# Patient Record
Sex: FEMALE | Race: WHITE | Employment: UNEMPLOYED | URBAN - METROPOLITAN AREA
[De-identification: names, ages, dates, MRNs, and addresses within clinical notes are randomized per-mention and may not be internally consistent; named-entity substitution may affect disease eponyms.]

---

## 2019-01-01 ENCOUNTER — ANESTHESIA (OUTPATIENT)
Dept: SURGERY | Age: 49
End: 2019-01-01
Payer: MEDICARE

## 2019-01-01 ENCOUNTER — HOSPITAL ENCOUNTER (EMERGENCY)
Age: 49
Discharge: HOME | End: 2019-07-21
Attending: EMERGENCY MEDICINE
Payer: MEDICARE

## 2019-01-01 ENCOUNTER — ANESTHESIA EVENT (OUTPATIENT)
Dept: SURGERY | Age: 49
End: 2019-01-01
Payer: MEDICARE

## 2019-01-01 ENCOUNTER — APPOINTMENT (OUTPATIENT)
Dept: GENERAL RADIOLOGY | Age: 49
End: 2019-01-01
Attending: UROLOGY
Payer: MEDICARE

## 2019-01-01 ENCOUNTER — HOSPITAL ENCOUNTER (OUTPATIENT)
Age: 49
Setting detail: OUTPATIENT SURGERY
Discharge: HOME | End: 2019-07-18
Attending: UROLOGY | Admitting: UROLOGY
Payer: MEDICARE

## 2019-01-01 VITALS
DIASTOLIC BLOOD PRESSURE: 59 MMHG | HEIGHT: 65 IN | OXYGEN SATURATION: 98 % | HEART RATE: 95 BPM | WEIGHT: 135 LBS | BODY MASS INDEX: 22.49 KG/M2 | SYSTOLIC BLOOD PRESSURE: 102 MMHG | RESPIRATION RATE: 16 BRPM | TEMPERATURE: 99.4 F

## 2019-01-01 VITALS
HEIGHT: 63 IN | WEIGHT: 130 LBS | TEMPERATURE: 98.9 F | BODY MASS INDEX: 23.04 KG/M2 | HEART RATE: 87 BPM | SYSTOLIC BLOOD PRESSURE: 91 MMHG | RESPIRATION RATE: 18 BRPM | OXYGEN SATURATION: 93 % | DIASTOLIC BLOOD PRESSURE: 47 MMHG

## 2019-01-01 DIAGNOSIS — N20.0 MULTIPLE RENAL CALCULI: ICD-10-CM

## 2019-01-01 DIAGNOSIS — R31.9 URINARY TRACT INFECTION WITH HEMATURIA, SITE UNSPECIFIED: Primary | ICD-10-CM

## 2019-01-01 DIAGNOSIS — N20.1 RIGHT URETERAL CALCULUS: Primary | ICD-10-CM

## 2019-01-01 DIAGNOSIS — N39.0 URINARY TRACT INFECTION WITH HEMATURIA, SITE UNSPECIFIED: Primary | ICD-10-CM

## 2019-01-01 LAB
ANION GAP SERPL CALC-SCNC: 4 MMOL/L (ref 3–18)
APPEARANCE UR: ABNORMAL
BACTERIA SPEC CULT: ABNORMAL
BACTERIA SPEC CULT: NORMAL
BACTERIA URNS QL MICRO: ABNORMAL /HPF
BASOPHILS # BLD: 3.3 K/UL (ref 0–0.1)
BASOPHILS NFR BLD: 2 % (ref 0–3)
BILIRUB UR QL: NEGATIVE
BLASTS NFR BLD MANUAL: 4 %
BUN SERPL-MCNC: 25 MG/DL (ref 7–18)
BUN/CREAT SERPL: 22 (ref 12–20)
CALCIUM SERPL-MCNC: 8.8 MG/DL (ref 8.5–10.1)
CHLORIDE SERPL-SCNC: 107 MMOL/L (ref 100–111)
CO2 SERPL-SCNC: 29 MMOL/L (ref 21–32)
COLOR STONE: NORMAL
COLOR UR: YELLOW
COMMENT, 519994: NORMAL
COMPOSITION, 120440: NORMAL
CREAT SERPL-MCNC: 1.12 MG/DL (ref 0.6–1.3)
DIFFERENTIAL METHOD BLD: ABNORMAL
DISCLAIMER, STO32L: NORMAL
EOSINOPHIL # BLD: 1.7 K/UL (ref 0–0.4)
EOSINOPHIL NFR BLD: 1 % (ref 0–5)
EPITH CASTS URNS QL MICRO: ABNORMAL /LPF (ref 0–5)
ERYTHROCYTE [DISTWIDTH] IN BLOOD BY AUTOMATED COUNT: 18.1 % (ref 11.6–14.5)
GLUCOSE SERPL-MCNC: 96 MG/DL (ref 74–99)
GLUCOSE UR STRIP.AUTO-MCNC: NEGATIVE MG/DL
HCT VFR BLD AUTO: 31.4 % (ref 35–45)
HGB BLD-MCNC: 9.8 G/DL (ref 12–16)
HGB UR QL STRIP: ABNORMAL
KETONES UR QL STRIP.AUTO: NEGATIVE MG/DL
LEUKOCYTE ESTERASE UR QL STRIP.AUTO: ABNORMAL
LYMPHOCYTES # BLD: 9.9 K/UL (ref 0.8–3.5)
LYMPHOCYTES NFR BLD: 6 % (ref 20–51)
MCH RBC QN AUTO: 30.1 PG (ref 24–34)
MCHC RBC AUTO-ENTMCNC: 31.2 G/DL (ref 31–37)
MCV RBC AUTO: 96.3 FL (ref 74–97)
METAMYELOCYTES NFR BLD MANUAL: 9 %
MONOCYTES # BLD: 31.5 K/UL (ref 0–1)
MONOCYTES NFR BLD: 19 % (ref 2–9)
MYELOCYTES NFR BLD MANUAL: 4 %
NEUTS BAND NFR BLD MANUAL: 30 % (ref 0–5)
NEUTS SEG # BLD: 34.8 K/UL (ref 1.8–8)
NEUTS SEG NFR BLD: 21 % (ref 42–75)
NIDUS STONE QL: NORMAL
NITRITE UR QL STRIP.AUTO: POSITIVE
NRBC BLD-RTO: 3 PER 100 WBC
PATH REV BLD -IMP: NORMAL
PH UR STRIP: 6 [PH] (ref 5–8)
PLATELET # BLD AUTO: 75 K/UL (ref 135–420)
PLATELET COMMENTS,PCOM: ABNORMAL
POTASSIUM SERPL-SCNC: 3.2 MMOL/L (ref 3.5–5.5)
PROMYELOCYTES NFR BLD MANUAL: 4 %
PROT UR STRIP-MCNC: 300 MG/DL
RBC # BLD AUTO: 3.26 M/UL (ref 4.2–5.3)
RBC #/AREA URNS HPF: ABNORMAL /HPF (ref 0–5)
RBC MORPH BLD: ABNORMAL
SERVICE CMNT-IMP: ABNORMAL
SERVICE CMNT-IMP: NORMAL
SIZE STONE: NORMAL MM
SODIUM SERPL-SCNC: 140 MMOL/L (ref 136–145)
SP GR UR REFRACTOMETRY: 1.02 (ref 1–1.03)
URATE CRY URNS QL MICRO: ABNORMAL
URATE MFR STONE: 100 %
UROBILINOGEN UR QL STRIP.AUTO: 1 EU/DL (ref 0.2–1)
WBC # BLD AUTO: 165.7 K/UL (ref 4.6–13.2)
WBC URNS QL MICRO: ABNORMAL /HPF (ref 0–4)
WT STONE: 34.8 MG

## 2019-01-01 PROCEDURE — C1758 CATHETER, URETERAL: HCPCS | Performed by: UROLOGY

## 2019-01-01 PROCEDURE — 87086 URINE CULTURE/COLONY COUNT: CPT

## 2019-01-01 PROCEDURE — 77030018842 HC SOL IRR SOD CL 9% BAXT -A: Performed by: UROLOGY

## 2019-01-01 PROCEDURE — 77030006974 HC BSKT URET RTVR BSC -C: Performed by: UROLOGY

## 2019-01-01 PROCEDURE — 96376 TX/PRO/DX INJ SAME DRUG ADON: CPT

## 2019-01-01 PROCEDURE — 74011250636 HC RX REV CODE- 250/636: Performed by: UROLOGY

## 2019-01-01 PROCEDURE — C2617 STENT, NON-COR, TEM W/O DEL: HCPCS | Performed by: UROLOGY

## 2019-01-01 PROCEDURE — 74011250636 HC RX REV CODE- 250/636: Performed by: EMERGENCY MEDICINE

## 2019-01-01 PROCEDURE — 77030008683 HC TU ET CUF COVD -A: Performed by: ANESTHESIOLOGY

## 2019-01-01 PROCEDURE — 74011250637 HC RX REV CODE- 250/637: Performed by: NURSE ANESTHETIST, CERTIFIED REGISTERED

## 2019-01-01 PROCEDURE — 74011636320 HC RX REV CODE- 636/320: Performed by: UROLOGY

## 2019-01-01 PROCEDURE — 87040 BLOOD CULTURE FOR BACTERIA: CPT

## 2019-01-01 PROCEDURE — 87077 CULTURE AEROBIC IDENTIFY: CPT

## 2019-01-01 PROCEDURE — 81001 URINALYSIS AUTO W/SCOPE: CPT

## 2019-01-01 PROCEDURE — 96375 TX/PRO/DX INJ NEW DRUG ADDON: CPT

## 2019-01-01 PROCEDURE — 74011250636 HC RX REV CODE- 250/636: Performed by: NURSE ANESTHETIST, CERTIFIED REGISTERED

## 2019-01-01 PROCEDURE — 87184 SC STD DISK METHOD PER PLATE: CPT

## 2019-01-01 PROCEDURE — 77030008477 HC STYL SATN SLP COVD -A: Performed by: ANESTHESIOLOGY

## 2019-01-01 PROCEDURE — 96365 THER/PROPH/DIAG IV INF INIT: CPT

## 2019-01-01 PROCEDURE — 76210000016 HC OR PH I REC 1 TO 1.5 HR: Performed by: UROLOGY

## 2019-01-01 PROCEDURE — 80048 BASIC METABOLIC PNL TOTAL CA: CPT

## 2019-01-01 PROCEDURE — 74011250636 HC RX REV CODE- 250/636

## 2019-01-01 PROCEDURE — C1726 CATH, BAL DIL, NON-VASCULAR: HCPCS | Performed by: UROLOGY

## 2019-01-01 PROCEDURE — 99283 EMERGENCY DEPT VISIT LOW MDM: CPT

## 2019-01-01 PROCEDURE — 77030032490 HC SLV COMPR SCD KNE COVD -B: Performed by: UROLOGY

## 2019-01-01 PROCEDURE — 77030012961 HC IRR KT CYSTO/TUR ICUM -A: Performed by: UROLOGY

## 2019-01-01 PROCEDURE — 76210000021 HC REC RM PH II 0.5 TO 1 HR: Performed by: UROLOGY

## 2019-01-01 PROCEDURE — 77030013079 HC BLNKT BAIR HGGR 3M -A: Performed by: ANESTHESIOLOGY

## 2019-01-01 PROCEDURE — 85025 COMPLETE CBC W/AUTO DIFF WBC: CPT

## 2019-01-01 PROCEDURE — 77030018836 HC SOL IRR NACL ICUM -A: Performed by: UROLOGY

## 2019-01-01 PROCEDURE — 96361 HYDRATE IV INFUSION ADD-ON: CPT

## 2019-01-01 PROCEDURE — 74420 UROGRAPHY RTRGR +-KUB: CPT

## 2019-01-01 PROCEDURE — 74011000250 HC RX REV CODE- 250

## 2019-01-01 PROCEDURE — C1769 GUIDE WIRE: HCPCS | Performed by: UROLOGY

## 2019-01-01 PROCEDURE — 76060000034 HC ANESTHESIA 1.5 TO 2 HR: Performed by: UROLOGY

## 2019-01-01 PROCEDURE — 76010000162 HC OR TIME 1.5 TO 2 HR INTENSV-TIER 1: Performed by: UROLOGY

## 2019-01-01 PROCEDURE — 82360 CALCULUS ASSAY QUANT: CPT

## 2019-01-01 PROCEDURE — 77030018846 HC SOL IRR STRL H20 ICUM -A: Performed by: UROLOGY

## 2019-01-01 DEVICE — URETERAL STENT
Type: IMPLANTABLE DEVICE | Site: URETER | Status: FUNCTIONAL
Brand: POLARIS™ ULTRA

## 2019-01-01 RX ORDER — SODIUM CHLORIDE, SODIUM LACTATE, POTASSIUM CHLORIDE, CALCIUM CHLORIDE 600; 310; 30; 20 MG/100ML; MG/100ML; MG/100ML; MG/100ML
25 INJECTION, SOLUTION INTRAVENOUS CONTINUOUS
Status: DISCONTINUED | OUTPATIENT
Start: 2019-01-01 | End: 2019-01-01 | Stop reason: HOSPADM

## 2019-01-01 RX ORDER — ONDANSETRON 2 MG/ML
4 INJECTION INTRAMUSCULAR; INTRAVENOUS ONCE
Status: DISCONTINUED | OUTPATIENT
Start: 2019-01-01 | End: 2019-01-01 | Stop reason: HOSPADM

## 2019-01-01 RX ORDER — SUCCINYLCHOLINE CHLORIDE 100 MG/5ML
SYRINGE (ML) INTRAVENOUS AS NEEDED
Status: DISCONTINUED | OUTPATIENT
Start: 2019-01-01 | End: 2019-01-01 | Stop reason: HOSPADM

## 2019-01-01 RX ORDER — MIDODRINE HYDROCHLORIDE 10 MG/1
20 TABLET ORAL 3 TIMES DAILY
COMMUNITY

## 2019-01-01 RX ORDER — ONDANSETRON 2 MG/ML
4 INJECTION INTRAMUSCULAR; INTRAVENOUS ONCE
Status: COMPLETED | OUTPATIENT
Start: 2019-01-01 | End: 2019-01-01

## 2019-01-01 RX ORDER — SODIUM CHLORIDE, SODIUM LACTATE, POTASSIUM CHLORIDE, CALCIUM CHLORIDE 600; 310; 30; 20 MG/100ML; MG/100ML; MG/100ML; MG/100ML
50 INJECTION, SOLUTION INTRAVENOUS CONTINUOUS
Status: DISCONTINUED | OUTPATIENT
Start: 2019-01-01 | End: 2019-01-01 | Stop reason: HOSPADM

## 2019-01-01 RX ORDER — NEOSTIGMINE METHYLSULFATE 1 MG/ML
INJECTION, SOLUTION INTRAVENOUS AS NEEDED
Status: DISCONTINUED | OUTPATIENT
Start: 2019-01-01 | End: 2019-01-01 | Stop reason: HOSPADM

## 2019-01-01 RX ORDER — OXYCODONE AND ACETAMINOPHEN 5; 325 MG/1; MG/1
1 TABLET ORAL
Qty: 12 TAB | Refills: 0 | Status: SHIPPED | OUTPATIENT
Start: 2019-01-01 | End: 2019-01-01

## 2019-01-01 RX ORDER — SODIUM CHLORIDE 0.9 % (FLUSH) 0.9 %
5-40 SYRINGE (ML) INJECTION AS NEEDED
Status: DISCONTINUED | OUTPATIENT
Start: 2019-01-01 | End: 2019-01-01 | Stop reason: HOSPADM

## 2019-01-01 RX ORDER — FUROSEMIDE 20 MG/1
20 TABLET ORAL DAILY
COMMUNITY

## 2019-01-01 RX ORDER — DOCUSATE SODIUM 100 MG/1
100 CAPSULE, LIQUID FILLED ORAL
Qty: 60 CAP | Refills: 0 | Status: SHIPPED | OUTPATIENT
Start: 2019-01-01 | End: 2019-01-01

## 2019-01-01 RX ORDER — LIDOCAINE HYDROCHLORIDE 20 MG/ML
INJECTION, SOLUTION EPIDURAL; INFILTRATION; INTRACAUDAL; PERINEURAL AS NEEDED
Status: DISCONTINUED | OUTPATIENT
Start: 2019-01-01 | End: 2019-01-01 | Stop reason: HOSPADM

## 2019-01-01 RX ORDER — MIDAZOLAM HYDROCHLORIDE 1 MG/ML
INJECTION, SOLUTION INTRAMUSCULAR; INTRAVENOUS AS NEEDED
Status: DISCONTINUED | OUTPATIENT
Start: 2019-01-01 | End: 2019-01-01 | Stop reason: HOSPADM

## 2019-01-01 RX ORDER — INSULIN LISPRO 100 [IU]/ML
INJECTION, SOLUTION INTRAVENOUS; SUBCUTANEOUS ONCE
Status: DISCONTINUED | OUTPATIENT
Start: 2019-01-01 | End: 2019-01-01 | Stop reason: HOSPADM

## 2019-01-01 RX ORDER — ONDANSETRON HYDROCHLORIDE 4 MG/2ML
INJECTION, SOLUTION INTRAMUSCULAR; INTRAVENOUS AS NEEDED
Status: DISCONTINUED | OUTPATIENT
Start: 2019-01-01 | End: 2019-01-01 | Stop reason: HOSPADM

## 2019-01-01 RX ORDER — HYDROMORPHONE HYDROCHLORIDE 1 MG/ML
1 INJECTION, SOLUTION INTRAMUSCULAR; INTRAVENOUS; SUBCUTANEOUS
Status: COMPLETED | OUTPATIENT
Start: 2019-01-01 | End: 2019-01-01

## 2019-01-01 RX ORDER — PROMETHAZINE HYDROCHLORIDE 25 MG/1
25 TABLET ORAL
COMMUNITY

## 2019-01-01 RX ORDER — OXYCODONE AND ACETAMINOPHEN 5; 325 MG/1; MG/1
1 TABLET ORAL AS NEEDED
Status: DISCONTINUED | OUTPATIENT
Start: 2019-01-01 | End: 2019-01-01 | Stop reason: HOSPADM

## 2019-01-01 RX ORDER — LEVOFLOXACIN 750 MG/1
750 TABLET ORAL DAILY
Qty: 10 TAB | Refills: 0 | Status: SHIPPED | OUTPATIENT
Start: 2019-01-01 | End: 2019-01-01 | Stop reason: ALTCHOICE

## 2019-01-01 RX ORDER — HYDROMORPHONE HYDROCHLORIDE 4 MG/1
4 TABLET ORAL AS NEEDED
COMMUNITY

## 2019-01-01 RX ORDER — CEPHALEXIN 250 MG/1
250 CAPSULE ORAL 3 TIMES DAILY
Qty: 9 CAP | Refills: 0 | Status: SHIPPED | OUTPATIENT
Start: 2019-01-01 | End: 2019-01-01

## 2019-01-01 RX ORDER — FENTANYL CITRATE 50 UG/ML
50 INJECTION, SOLUTION INTRAMUSCULAR; INTRAVENOUS AS NEEDED
Status: DISCONTINUED | OUTPATIENT
Start: 2019-01-01 | End: 2019-01-01 | Stop reason: HOSPADM

## 2019-01-01 RX ORDER — VECURONIUM BROMIDE FOR INJECTION 1 MG/ML
INJECTION, POWDER, LYOPHILIZED, FOR SOLUTION INTRAVENOUS AS NEEDED
Status: DISCONTINUED | OUTPATIENT
Start: 2019-01-01 | End: 2019-01-01 | Stop reason: HOSPADM

## 2019-01-01 RX ORDER — FOLIC ACID 1 MG/1
1 TABLET ORAL DAILY
COMMUNITY

## 2019-01-01 RX ORDER — FAMOTIDINE 20 MG/1
20 TABLET, FILM COATED ORAL
Status: COMPLETED | OUTPATIENT
Start: 2019-01-01 | End: 2019-01-01

## 2019-01-01 RX ORDER — DEXAMETHASONE SODIUM PHOSPHATE 4 MG/ML
INJECTION, SOLUTION INTRA-ARTICULAR; INTRALESIONAL; INTRAMUSCULAR; INTRAVENOUS; SOFT TISSUE AS NEEDED
Status: DISCONTINUED | OUTPATIENT
Start: 2019-01-01 | End: 2019-01-01 | Stop reason: HOSPADM

## 2019-01-01 RX ORDER — LEVOFLOXACIN 5 MG/ML
750 INJECTION, SOLUTION INTRAVENOUS
Status: COMPLETED | OUTPATIENT
Start: 2019-01-01 | End: 2019-01-01

## 2019-01-01 RX ORDER — GLYCOPYRROLATE 0.6MG/3ML
SYRINGE (ML) INTRAVENOUS AS NEEDED
Status: DISCONTINUED | OUTPATIENT
Start: 2019-01-01 | End: 2019-01-01 | Stop reason: HOSPADM

## 2019-01-01 RX ORDER — ERGOCALCIFEROL 1.25 MG/1
50000 CAPSULE ORAL
COMMUNITY

## 2019-01-01 RX ORDER — GENTAMICIN SULFATE 40 MG/ML
INJECTION, SOLUTION INTRAMUSCULAR; INTRAVENOUS AS NEEDED
Status: DISCONTINUED | OUTPATIENT
Start: 2019-01-01 | End: 2019-01-01 | Stop reason: HOSPADM

## 2019-01-01 RX ORDER — VENLAFAXINE HYDROCHLORIDE 225 MG/1
225 TABLET, EXTENDED RELEASE ORAL DAILY
COMMUNITY

## 2019-01-01 RX ORDER — PROPOFOL 10 MG/ML
INJECTION, EMULSION INTRAVENOUS AS NEEDED
Status: DISCONTINUED | OUTPATIENT
Start: 2019-01-01 | End: 2019-01-01 | Stop reason: HOSPADM

## 2019-01-01 RX ORDER — VARENICLINE TARTRATE 1 MG/1
1 TABLET, FILM COATED ORAL 2 TIMES DAILY
COMMUNITY

## 2019-01-01 RX ORDER — POTASSIUM CHLORIDE 750 MG/1
10 TABLET, FILM COATED, EXTENDED RELEASE ORAL 2 TIMES DAILY
COMMUNITY

## 2019-01-01 RX ORDER — ZOLPIDEM TARTRATE 12.5 MG/1
12.5 TABLET, FILM COATED, EXTENDED RELEASE ORAL
COMMUNITY

## 2019-01-01 RX ORDER — SODIUM CHLORIDE 0.9 % (FLUSH) 0.9 %
5-40 SYRINGE (ML) INJECTION EVERY 8 HOURS
Status: DISCONTINUED | OUTPATIENT
Start: 2019-01-01 | End: 2019-01-01 | Stop reason: HOSPADM

## 2019-01-01 RX ORDER — FENTANYL CITRATE 50 UG/ML
INJECTION, SOLUTION INTRAMUSCULAR; INTRAVENOUS AS NEEDED
Status: DISCONTINUED | OUTPATIENT
Start: 2019-01-01 | End: 2019-01-01 | Stop reason: HOSPADM

## 2019-01-01 RX ORDER — CEFAZOLIN SODIUM IN 0.9 % NACL 2 G/100 ML
PLASTIC BAG, INJECTION (ML) INTRAVENOUS AS NEEDED
Status: DISCONTINUED | OUTPATIENT
Start: 2019-01-01 | End: 2019-01-01 | Stop reason: HOSPADM

## 2019-01-01 RX ADMIN — HYDROMORPHONE HYDROCHLORIDE 1 MG: 1 INJECTION, SOLUTION INTRAMUSCULAR; INTRAVENOUS; SUBCUTANEOUS at 11:44

## 2019-01-01 RX ADMIN — Medication 70 MG: at 14:57

## 2019-01-01 RX ADMIN — Medication 2 G: at 15:15

## 2019-01-01 RX ADMIN — ONDANSETRON HYDROCHLORIDE 4 MG: 4 INJECTION, SOLUTION INTRAMUSCULAR; INTRAVENOUS at 16:10

## 2019-01-01 RX ADMIN — LIDOCAINE HYDROCHLORIDE 60 MG: 20 INJECTION, SOLUTION EPIDURAL; INFILTRATION; INTRACAUDAL; PERINEURAL at 14:57

## 2019-01-01 RX ADMIN — VECURONIUM BROMIDE FOR INJECTION 1 MG: 1 INJECTION, POWDER, LYOPHILIZED, FOR SOLUTION INTRAVENOUS at 15:10

## 2019-01-01 RX ADMIN — FAMOTIDINE 20 MG: 20 TABLET ORAL at 14:13

## 2019-01-01 RX ADMIN — SODIUM CHLORIDE 500 ML: 900 INJECTION, SOLUTION INTRAVENOUS at 11:48

## 2019-01-01 RX ADMIN — NEOSTIGMINE METHYLSULFATE 3 MG: 1 INJECTION, SOLUTION INTRAVENOUS at 16:18

## 2019-01-01 RX ADMIN — Medication 0.4 MG: at 16:18

## 2019-01-01 RX ADMIN — MIDAZOLAM HYDROCHLORIDE 2 MG: 1 INJECTION, SOLUTION INTRAMUSCULAR; INTRAVENOUS at 14:52

## 2019-01-01 RX ADMIN — FENTANYL CITRATE 50 MCG: 50 INJECTION, SOLUTION INTRAMUSCULAR; INTRAVENOUS at 14:57

## 2019-01-01 RX ADMIN — PROPOFOL 130 MG: 10 INJECTION, EMULSION INTRAVENOUS at 14:57

## 2019-01-01 RX ADMIN — VECURONIUM BROMIDE FOR INJECTION 1 MG: 1 INJECTION, POWDER, LYOPHILIZED, FOR SOLUTION INTRAVENOUS at 15:51

## 2019-01-01 RX ADMIN — LEVOFLOXACIN 750 MG: 750 INJECTION, SOLUTION INTRAVENOUS at 12:19

## 2019-01-01 RX ADMIN — HYDROMORPHONE HYDROCHLORIDE 1 MG: 1 INJECTION, SOLUTION INTRAMUSCULAR; INTRAVENOUS; SUBCUTANEOUS at 13:20

## 2019-01-01 RX ADMIN — ONDANSETRON 4 MG: 2 INJECTION INTRAMUSCULAR; INTRAVENOUS at 11:48

## 2019-01-01 RX ADMIN — FENTANYL CITRATE 50 MCG: 50 INJECTION, SOLUTION INTRAMUSCULAR; INTRAVENOUS at 16:55

## 2019-01-01 RX ADMIN — DEXAMETHASONE SODIUM PHOSPHATE 4 MG: 4 INJECTION, SOLUTION INTRA-ARTICULAR; INTRALESIONAL; INTRAMUSCULAR; INTRAVENOUS; SOFT TISSUE at 15:21

## 2019-01-01 RX ADMIN — FENTANYL CITRATE 25 MCG: 50 INJECTION, SOLUTION INTRAMUSCULAR; INTRAVENOUS at 15:57

## 2019-01-01 RX ADMIN — SODIUM CHLORIDE, SODIUM LACTATE, POTASSIUM CHLORIDE, AND CALCIUM CHLORIDE 50 ML/HR: 600; 310; 30; 20 INJECTION, SOLUTION INTRAVENOUS at 14:13

## 2019-01-01 RX ADMIN — VECURONIUM BROMIDE FOR INJECTION 2 MG: 1 INJECTION, POWDER, LYOPHILIZED, FOR SOLUTION INTRAVENOUS at 15:19

## 2019-01-01 RX ADMIN — OXYCODONE HYDROCHLORIDE AND ACETAMINOPHEN 1 TABLET: 5; 325 TABLET ORAL at 17:26

## 2019-07-16 NOTE — PERIOP NOTES
PAT - SURGICAL PRE-ADMISSION INSTRUCTIONS 
 
NAME:  Letty Marie                                                          TODAY'S DATE:  7/16/2019 SURGERY DATE:  7/18/2019                                  SURGERY ARRIVAL TIME:   1200 1. Do NOT eat or drink anything, including candy or gum, after 0400 on 07/17 , unless you have specific instructions from your Surgeon or Anesthesia Provider to do so. 2. No smoking on the day of surgery. 3. No alcohol 24 hours prior to the day of surgery. 4. No recreational drugs for one week prior to the day of surgery. 5. Leave all valuables, including money/purse, at home. 6. Remove all jewelry, nail polish, makeup (including mascara); no lotions, powders, deodorant, or perfume/cologne/after shave. 7. Glasses/Contact lenses and Dentures may be worn to the hospital.  They will be removed prior to surgery. 8. Call your doctor if symptoms of a cold or illness develop within 24 ours prior to surgery. 9. AN ADULT MUST DRIVE YOU HOME AFTER OUTPATIENT SURGERY. 10. If you are having an OUTPATIENT procedure, please make arrangements for a responsible adult to be with you for 24 hours after your surgery. 11. If you are admitted to the hospital, you will be assigned to a bed after surgery is complete. Normally a family member will not be able to see you until you are in your assigned bed. 15. Family is encouraged to accompany you to the hospital.  We ask visitors in the treatment area to be limited to ONE person at a time to ensure patient privacy. EXCEPTIONS WILL BE MADE AS NEEDED. 15. Children under 12 are discouraged from entering the treatment area and need to be supervised by an adult when in the waiting room. Special Instructions: 
 
Bring inhaler. , Take these medications the morning of surgery with a sip of water:  Phenergan,Midodrine , Gabapentin, may take Xanax,oxycodone Patient Prep: 
 
shower with anti-bacterial soap These surgical instructions were reviewed with [facility] during the PAT phone call. Directions: On the morning of surgery, please go to the [address] [facility]. Enter the building from the [facility] entrance, 1st floor (next to the Emergency Room entrance). Take the elevator to the 2nd floor. Sign in at the Registration Desk. If you have any questions and/or concerns, please do not hesitate to call: 
(Prior to the day of surgery)  PAS unit:  [phone] (Day of surgery)  [address] unit:  [phone]

## 2019-07-18 PROBLEM — N20.0 MULTIPLE RENAL CALCULI: Status: ACTIVE | Noted: 2019-01-01

## 2019-07-18 PROBLEM — N20.1 RIGHT URETERAL CALCULUS: Status: ACTIVE | Noted: 2019-01-01

## 2019-07-18 NOTE — ANESTHESIA POSTPROCEDURE EVALUATION
Procedure(s):  Cystoscopy Right Ureteroscopy Laser Lithotripsy, Right Stent Exchange. No value filed. Anesthesia Post Evaluation      Multimodal analgesia: multimodal analgesia used between 6 hours prior to anesthesia start to PACU discharge  Patient location during evaluation: bedside  Patient participation: complete - patient participated  Level of consciousness: awake  Pain management: adequate  Airway patency: patent  Anesthetic complications: no  Cardiovascular status: stable  Respiratory status: acceptable  Hydration status: acceptable  Post anesthesia nausea and vomiting:  controlled      Vitals Value Taken Time   BP 99/59 7/18/2019  5:42 PM   Temp 37.4 °C (99.4 °F) 7/18/2019  5:50 PM   Pulse 72 7/18/2019  5:55 PM   Resp 12 7/18/2019  5:55 PM   SpO2 89 % 7/18/2019  5:55 PM   Vitals shown include unvalidated device data.

## 2019-07-18 NOTE — H&P
Letty Marie  : 1970  Encounter Date: 7/15/2019            Encounter Diagnoses       ICD-10-CM ICD-9-CM   1. Kidney stone N20.0 592.0      ASSESSMENT:  1. Right urolithiasis. CT 19: a 5mm right UVJ stone w/ hydro, a 4mm stone migrated to right renal pelvis, additional right non-obstructing stones. S/p right jj stent placement 19.      2. Increased splenomegaly with mass effect on the left kidney, stomach and multiple bowel loops by CT 19.      3. CMML  4. History of breast cancer.      PLAN:  CT scan 19 images reviewed and copy of report provided. Continue Keflex 250mg TID through surgery, rx provided. Scheduled for right URS, stone extraction and stent exchange 19.      DISCUSSION:   We discussed the risks and benefits of ureteroscopic stone extraction. Specific risks include bleed infection(possible sepsis), stent intolerance(frequency, bladder spasms, pain, pain with voiding), possible injury to the ureter requiring a stent for up to 3 weeks to allow the ureter to heal, possibility of not being able to pass a stent requiring a nephrostomy tube to be placed, possibility of not renderring him/her stone free requiring additional surgeries. Possible need for a short term jj stent.         Patient's BMI is out of the normal parameters. Information about BMI was given and patient was advised to follow-up with their PCP for further management.          Chief Complaint   Patient presents with   Diego Flax Kidney Stone       Pre bOp         HISTORY OF PRESENT ILLNESS:   Letty Marie is a 51 y.o. female who presents today in follow up for right urolithiasis. Patient hospitalized 19 for right urolithiasis, CT scan showed a 5mm right UVJ stone w/ hydro, a 4mm stone migrated to right renal pelvis, additional right non-obstructing stones. S/p right jj stent placement 19. Negative urine culture.      Tolerating stent. Mild flank discomfort. No severe pain.    Denies recent f/c/n/v. Denies gross hematuria. Asymptomatic for urinary infection.      Prior history of kidney stones in her 25s.       Labs/Radiology     CT Abd Pelv wo Cont 7/5/19  IMPRESSION  1. New mild right hydronephrosis with a 4-5 mm stone at the UVJ, increased size of right nephrolithiasis and migration of 3 mm stone into the right renal pelvis. 2. Increased splenomegaly with mass effect on the left kidney, stomach and multiple bowel loops. Increased size of a few of the splenic infarcts. Trace perisplenic ascites. 3. Similar hepatomegaly. 4. Similar nonobstructing colon containing epigastric hernia. Postsurgical changes of hernia repair. 5. Mild anasarca with similar more significant moderate edema anterior abdominal and pelvic wall.   6. Mild to moderate diverticulosis.           Results for orders placed or performed in visit on 07/15/19   AMB POC URINALYSIS DIP STICK AUTO W/O MICRO   Result Value Ref Range     Color (UA POC) Yellow       Clarity (UA POC) Clear       Glucose (UA POC) Negative Negative     Bilirubin (UA POC) Negative Negative     Ketones (UA POC) Negative Negative     Specific gravity (UA POC) 1.025 1.001 - 1.035     Blood (UA POC) 3+ Negative     pH (UA POC) 5.5 4.6 - 8.0     Protein (UA POC) 3+ Negative     Urobilinogen (UA POC) 0.2 mg/dL 0.2 - 1     Nitrites (UA POC) Negative Negative     Leukocyte esterase (UA POC) 1+ Negative              Past Medical History:   Diagnosis Date    5q- syndrome (HCC)      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      Breast cancer ([facility] [address] 2011     bilaterial side    Chronic obstructive pulmonary disease (HCC)       on Home O2 4l/ min  almost continous    Chronic pain       Back    Coagulation disorder (HCC)      Depression      Hematochezia 08/2011    Ill-defined condition       Guilliam-Foley Syndrome    Ill-defined condition       Leukemia   (CMML)    Kidney stone      Paraparesis ([facility] [address] 04/2015    Thyroid disease      Tobacco abuse       Past Surgical History:   Procedure Laterality Date    HX HYSTERECTOMY        HX MASTECTOMY         Bilateral    HX UROLOGICAL   07/05/2019     Cystoscopy, right double-J stent placement.      History reviewed. No pertinent family history. Social History            Socioeconomic History    Marital status:        Spouse name: Not on file    Number of children: Not on file    Years of education: Not on file    Highest education level: Not on file   Occupational History    Not on file   Social Needs    Financial resource strain: Not on file    Food insecurity:       Worry: Not on file       Inability: Not on file    Transportation needs:       Medical: Not on file       Non-medical: Not on file   Tobacco Use    Smoking status: Current Every Day Smoker       Packs/day: 1.00    Smokeless tobacco: Never Used   Substance and Sexual Activity    Alcohol use:  Yes       Alcohol/week: 1.2 - 1.8 oz       Types: 2 - 3 Cans of beer per week    Drug use: No    Sexual activity: Not on file   Lifestyle    Physical activity:       Days per week: Not on file       Minutes per session: Not on file    Stress: Not on file   Relationships    Social connections:       Talks on phone: Not on file       Gets together: Not on file       Attends Shinto service: Not on file       Active member of club or organization: Not on file       Attends meetings of clubs or organizations: Not on file       Relationship status: Not on file    Intimate partner violence:       Fear of current or ex partner: Not on file       Emotionally abused: Not on file       Physically abused: Not on file       Forced sexual activity: Not on file   Other Topics Concern    Not on file   Social History Narrative    Not on file           Allergies   Allergen Reactions    Tape [Adhesive] Contact Dermatitis             Current Outpatient Medications   Medication Sig Dispense Refill    midodrine HCl (MIDODRINE PO) Take  by mouth.        ALPRAZolam (XANAX) 0.5 mg tablet Take  by mouth.        ruxolitinib (JAKAFI) 10 mg tab Take  by mouth.        cephALEXin (KEFLEX) 250 mg capsule Take 1 Cap by mouth three (3) times daily for 5 days. 15 Cap 0    cyanocobalamin (VITAMIN B12) 1,000 mcg/mL injection 2,000 mcg by IntraMUSCular route every month.        gabapentin (NEURONTIN) 600 mg tablet Take 1,200 mg by mouth three (3) times daily as needed for Pain.        oxyCODONE-acetaminophen (PERCOCET 10)  mg per tablet Take 1 Tab by mouth every eight (8) hours as needed for Pain.        zolpidem (AMBIEN) 10 mg tablet Take 10 mg by mouth nightly as needed.        LORazepam (ATIVAN) 1 mg tablet Take 1 mg by mouth nightly as needed.        albuterol (PROVENTIL HFA, VENTOLIN HFA, PROAIR HFA) 90 mcg/actuation inhaler Take 2 Puffs by inhalation every six (6) hours as needed for Wheezing.             Review of Systems  Constitutional: Fever:   Skin: Rash: HEENT: Hearing difficulty:   Eyes: Blurred vision:   Cardiovascular: Chest pain:   Respiratory: Shortness of breath:   Gastrointestinal: Nausea/vomiting:   Musculoskeletal: Back pain:   Neurological: Weakness:   Psychological: Memory loss:   Comments/additional findings:      Physical Examination  Visit Vitals  /70   Ht 5' 5\" (1.651 m)   Wt 138 lb (62.6 kg)   BMI 22.96 kg/m²      Constitutional: Well developed, well-nourished female in no acute distress. CV:  No peripheral swelling noted. RRR  Respiratory: No respiratory distress or difficulties. CTA b  Abdomen:  Soft and nontender. No masses.  Female:  No CVA tenderness. Skin: No bruising or rashes. No petechia. Neuro/Psych:  Patient with appropriate affect. Alert and oriented.       CC: MD Korina Magallanes Ahsan. Mildred Chris MD  [facility], [address] [facility] documentation is provided with the assistance of Yaneth Aldana, medical scribe for Colletta Endo, MD on 7/15/2019      Date of Surgery Update:  Giovani Perrin was seen and examined. History and physical has been reviewed. The patient has been examined. There have been no significant clinical changes since the completion of the originally dated History and Physical.  Patient identified by surgeon; surgical site was confirmed by patient and surgeon.     Signed By: Walt Hackett MD     July 18, 2019 1:18 PM

## 2019-07-18 NOTE — ANESTHESIA PREPROCEDURE EVALUATION
Relevant Problems   No relevant active problems       Anesthetic History   No history of anesthetic complications            Review of Systems / Medical History  Patient summary reviewed, nursing notes reviewed and pertinent labs reviewed    Pulmonary    COPD: moderate               Neuro/Psych         Psychiatric history     Cardiovascular                  Exercise tolerance: <4 METS     GI/Hepatic/Renal         Renal disease: stones       Endo/Other        Arthritis     Other Findings        On home O2 at 3L x 24H         Anesthetic Plan    ASA: 3

## 2019-07-18 NOTE — PERIOP NOTES
Pre-Op Summary    Pt arrived via car with family/friend and is oriented to time, place, person and situation. Patient with steady gait with none assistive devices. Visit Vitals  /70 (BP 1 Location: Left arm, BP Patient Position: At rest)   Pulse 82   Temp 98.5 °F (36.9 °C)   Resp 20   Ht (P) 5' 5\" (1.651 m)   Wt (P) 61.2 kg (135 lb)   SpO2 99%   BMI (P) 22.47 kg/m²       Peripheral IV located on Left hand . Patients belongings are located with sister, o2 tank and clothes in closet. Patient's point of contact is sister Karie Hashimoto and their contact number [phone] They will be in the waiting room. They are able to receive medication information. They will be their ride home.

## 2019-07-18 NOTE — BRIEF OP NOTE
BRIEF OPERATIVE NOTE    Date of Procedure: 7/18/2019   Preoperative Diagnosis: Kidney Stones N20.0  Postoperative Diagnosis: Kidney Stones N20.0    Procedure(s):  Cystoscopy Right Ureteroscopy Laser Lithotripsy, Right Stent Exchange  Surgeon(s) and Role:     * Waqar Abbott MD - Primary         Surgical Assistant: none    Surgical Staff:  Circ-1: Yola Painting RN  Radiology Technician: Patrizia yIer  Scrub Tech-1: Simba Scales  Event Time In Time Out   Incision Start  3:15 PM    Incision Close  4:19 PM      Anesthesia: General   Estimated Blood Loss: minimal  Specimens:   ID Type Source Tests Collected by Time Destination   1 : RIGHT KIDNEY STONE FOR ANALYSIS  Kidney, Right  Waqar Abbott MD 7/18/2019  3:34 PM Pathology   1 : RIGHT RENAL URINE FOR CULTURE Urine Kidney, Right CULTURE, URINE Waqar Abbott MD 7/18/2019  3:30 PM Microbiology      Findings:  Multiple mid to distal ureteral calculi laser fragmented and extracted. Multiple right renal calculi up to 6 mm all dusted. Complications: none  Implants:   Implant Name Type Inv.  Item Serial No.  Lot No. LRB No. Used Action   Silvestre Daily [id number] Shavonne Yvette  [address] Dara Pouch [id number] Right 1 Implanted       Kaykay Jimenez MD

## 2019-07-18 NOTE — DISCHARGE INSTRUCTIONS
Patient Education        Cystoscopy: What to Expect at [address]    A cystoscopy is a procedure that lets a doctor look inside of the bladder and the urethra. The urethra is the tube that carries urine from the bladder to outside the body. The doctor uses a thin, lighted tool called a cystoscope. Your bladder is filled with fluid. This stretches the bladder so that your doctor can look closely at the inside of your bladder. After the cystoscopy, your urethra may be sore at first, and it may burn when you urinate for the first few days after the procedure. You may feel the need to urinate more often, and your urine may be pink. These symptoms should get better in 1 or 2 days. You will probably be able to go back to most of your usual activities in 1 or 2 days. This care sheet gives you a general idea about how long it will take for you to recover. But each person recovers at a different pace. Follow the steps below to get better as quickly as possible. How can you care for yourself at home? Activity    · Rest when you feel tired. Getting enough sleep will help you recover.     · Try to walk each day. Start by walking a little more than you did the day before. Bit by bit, increase the amount you walk. Walking boosts blood flow and helps prevent pneumonia and constipation.     · Avoid strenuous activities, such as bicycle riding, jogging, weight lifting, or aerobic exercise, until your doctor says it is okay.     · Ask your doctor when you can drive again.     · Most people are able to return to work within 1 or 2 days after the procedure.     · You may shower and take baths as usual.     · Ask your doctor when it is okay for you to have sex. Diet    · You can eat your normal diet. If your stomach is upset, try bland, low-fat foods like plain rice, broiled chicken, toast, and yogurt.     · Drink plenty of fluids (unless your doctor tells you not to).    Medicines    · Take pain medicines exactly as directed. ? If the doctor gave you a prescription medicine for pain, take it as prescribed. ? If you are not taking a prescription pain medicine, ask your doctor if you can take an over-the-counter medicine.     · If you think your pain medicine is making you sick to your stomach:  ? Take your medicine after meals (unless your doctor has told you not to). ? Ask your doctor for a different pain medicine.     · If your doctor prescribed antibiotics, take them as directed. Do not stop taking them just because you feel better. You need to take the full course of antibiotics. Follow-up care is a key part of your treatment and safety. Be sure to make and go to all appointments, and call your doctor if you are having problems. It's also a good idea to know your test results and keep a list of the medicines you take. When should you call for help? Call 911 anytime you think you may need emergency care. For example, call if:    · You passed out (lost consciousness).     · You have severe trouble breathing.     · You have sudden chest pain and shortness of breath, or you cough up blood.     · You have severe belly pain.    Call your doctor now or seek immediate medical care if:    · You are sick to your stomach or cannot keep fluids down.     · Your urine is still red or you see blood clots after you have urinated several times.     · You have trouble passing urine or stool, especially if you have pain or swelling in your lower belly.     · You have signs of a blood clot, such as:  ? Pain in your calf, back of the knee, thigh, or groin. ? Redness and swelling in your leg or groin.     · You develop a fever or severe chills.     · You have pain in your back just below your rib cage. This is called flank pain.    Watch closely for changes in your health, and be sure to contact your doctor if:    · You have pain or burning when you urinate.  A burning feeling is normal for a day or two after the test, but call if it does not get better.     · You have a frequent urge to urinate but can pass only small amounts of urine.     · Your urine is pink, red, or cloudy, or smells bad. It is normal for the urine to have a pinkish color for a few days after the test, but call if it does not get better. Where can you learn more? Go to http://dipak-siri.info/. Enter N652 in the search box to learn more about \"Cystoscopy: What to Expect at Home. \"  Current as of: March 20, 2018  Content Version: 11.9  © 4021-1944 [employer]. Care instructions adapted under license by [employer] (which disclaims liability or warranty for this information). If you have questions about a medical condition or this instruction, always ask your healthcare professional. [address] any warranty or liability for your use of this information. DISCHARGE SUMMARY from Nurse    PATIENT INSTRUCTIONS:    After general anesthesia or intravenous sedation, for 24 hours or while taking prescription Narcotics:  · Limit your activities  · Do not drive and operate hazardous machinery  · Do not make important personal or business decisions  · Do  not drink alcoholic beverages  · If you have not urinated within 8 hours after discharge, please contact your surgeon on call.     Report the following to your surgeon:  · Excessive pain, swelling, redness or odor of or around the surgical area  · Temperature over 100.5  · Nausea and vomiting lasting longer than 4 hours or if unable to take medications  · Any signs of decreased circulation or nerve impairment to extremity: change in color, persistent  numbness, tingling, coldness or increase pain  · Any questions    What to do at Home:      These are general instructions for a healthy lifestyle:    No smoking/ No tobacco products/ Avoid exposure to second hand smoke  Surgeon General's Warning:  Quitting smoking now greatly reduces serious risk to your health. Obesity, smoking, and sedentary lifestyle greatly increases your risk for illness    A healthy diet, regular physical exercise & weight monitoring are important for maintaining a healthy lifestyle    You may be retaining fluid if you have a history of heart failure or if you experience any of the following symptoms:  Weight gain of 3 pounds or more overnight or 5 pounds in a week, increased swelling in our hands or feet or shortness of breath while lying flat in bed. Please call your doctor as soon as you notice any of these symptoms; do not wait until your next office visit. The discharge information has been reviewed with the patient. The patient verbalized understanding. Discharge medications reviewed with the patient and appropriate educational materials and side effects teaching were provided. ___________________________________________________________________________________________________________________________________    Patient armband removed and given to patient to take home.   Patient was informed of the privacy risks if armband lost or stolen

## 2019-07-21 NOTE — ED TRIAGE NOTES
Patient with kidney stone, had stent placed and lithotripsy, patient with persistent flank pain and hematuria

## 2019-07-21 NOTE — ED PROVIDER NOTES
45-year-old female with multiple medical issues including leukemia multiple kidney stones on the right with recent stent placed and reevaluated 3 days ago by Dr. Shelton Shabazz of urology. 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     Neuropathy due to chemotherapeutic drug ([address]     Paraparesis ([address] 04/2015    Tobacco abuse        Past Surgical History:   Procedure Laterality Date    HX BREAST RECONSTRUCTION Bilateral     HX HERNIA REPAIR      x 3    HX HYSTERECTOMY      HX MASTECTOMY Bilateral     HX UROLOGICAL  07/05/2019    Cystoscopy, right double-J stent placement. History reviewed. No pertinent family history.     Social History     Socioeconomic History    Marital status:      Spouse name: Not on file    Number of children: Not on file    Years of education: Not on file    Highest education level: Not on file   Occupational History    Not on file   Social Needs    Financial resource strain: Not on file    Food insecurity:     Worry: Not on file     Inability: Not on file    Transportation needs:     Medical: Not on file     Non-medical: Not on file   Tobacco Use    Smoking status: Current Every Day Smoker     Packs/day: 0.25    Smokeless tobacco: Never Used   Substance and Sexual Activity    Alcohol use: Not Currently    Drug use: No    Sexual activity: Not on file   Lifestyle    Physical activity:     Days per week: Not on file     Minutes per session: Not on file    Stress: Not on file   Relationships    Social connections:     Talks on phone: Not on file     Gets together: Not on file     Attends Yarsanism service: Not on file     Active member of club or organization: Not on file     Attends meetings of clubs or organizations: Not on file     Relationship status: Not on file    Intimate partner violence:     Fear of current or ex partner: Not on file     Emotionally abused: Not on file     Physically abused: Not on file     Forced sexual activity: Not on file   Other Topics Concern    Not on file   Social History Narrative    Not on file         ALLERGIES: Tape [adhesive]    Review of Systems   Constitutional: Negative for diaphoresis and fever. HENT: Negative for sore throat. Respiratory: Negative for shortness of breath. Cardiovascular: Negative for chest pain. Gastrointestinal: Positive for abdominal distention (  Baseline). Negative for abdominal pain. Genitourinary: Positive for flank pain. Musculoskeletal: Negative for back pain. Skin: Negative for rash. Neurological: Negative for light-headedness. All other systems reviewed and are negative. Vitals:    07/21/19 1130 07/21/19 1132 07/21/19 1135 07/21/19 1146   BP: 98/58      Pulse:    87   Resp:    18   Temp:   98.9 °F (37.2 °C)    SpO2: 92% 94%  96%   Weight:    59 kg (130 lb)   Height:    5' 3\" (1.6 m)            Physical Exam   Constitutional: No distress. Cachectic elderly appearing female older than stated age. HENT:   Head: Normocephalic. Eyes: EOM are normal.   Cardiovascular: Normal rate. Pulmonary/Chest: Effort normal and breath sounds normal.   Abdominal: Soft.  Bowel sounds are normal. She exhibits distension (  Baseline per patient). Neurological: She is alert. Skin: Skin is warm. She is not diaphoretic. Psychiatric: She has a normal mood and affect. Nursing note and vitals reviewed. MDM  Number of Diagnoses or Management Options  Diagnosis management comments: Old records reviewed. We will check appropriate blood work urinalysis treat pain with Dilaudid and Zofran hold CAT scan at this time based upon blood work    Heart rate 99 at bedside pulse ox 9 9% on oxygen patient is on home O2. Blood pressure 92/56 she has a history of low blood pressure for which she takes midregion. Old records reviewed she was placed on Keflex approximately 5 days ago by urology we have sent a urine culture due to her urine being nitrite positive and will sick appropriate blood work discussed with the urologist once all blood work is back    CBC essentially unremarkable baseline for her with white blood cell count of 160 hemoglobin 9.8 normal baseline is 9 patient blood pressure 105/50 but she feels improved pulse ox 9 9% on 2 L we will give her a repeat dose of Dilaudid due to her chronic pain she has pain meds at home.   This case was discussed with the on-call urologist Dr. Britt Almazan I feel she warrants a change in her antibiotic regimen from Keflex which is been off for the past 4 days to Levaquin due to nitrite positive urine which she has never had in the past culture has been sent to questions answered will discharge home         Procedures

## 2019-07-25 NOTE — OP NOTES
[facility]  OPERATIVE REPORT    Name:  Brissa Jimenez  MR#:   [id number]  :  1970  ACCOUNT #:  [de-identified]  DATE OF SERVICE:  2019    PREOPERATIVE DIAGNOSES:  Right renal calculus, right ureteral calculi. POSTOPERATIVE DIAGNOSES:  Right renal calculus, right ureteral calculi. PROCEDURE PERFORMED:  Cystoscopy, right complex ureteroscopy with semi-rigid and flexible ureteroscopy, laser lithotripsy of multiple stones, right double-J stent placement. SURGEON:  Minda Morales MD    SURGICAL ASSISTANT:  None. PROCEDURE TIME:  Start time was 03:15 p.m. End time was 04:19 p.m. ANESTHESIA:  General.    COMPLICATIONS:  None. SPECIMENS REMOVED:  Right renal and ureteral calculi for stone analysis. Right renal urine for culture and sensitivity. IMPLANTS:  A 6-Romansh x 26 cm double-J ureteral stent, right ureter. ESTIMATED BLOOD LOSS:  Minimal.    FINDINGS:  Multiple distal to mid ureteral calculi laser fragmented and extracted. Multiple right renal calculi up to 6 mm dusted completely with no significant residual stone fragments. INDICATIONS:  This is a 52-year-old female with multiple stones causing recurrent episodes of renal colic. She is status post stent placement and she initially had a stone in her proximal ureter, stone in her distal ureter, and multiple stones in her kidney. Now, it appears that she has had some further distal migration of multiple stones in the ureter. The patient now presents for a definitive therapy with cystoscopy, right ureteroscopy, and laser lithotripsy and stent replacement. She understands the procedure and desires to proceed. PROCEDURE IN DETAIL:  The patient was identified in the holding area, given informed consent, was then taken to the cystoscopy suite. She was placed on cysto table in supine position.   After adequate anesthesia, the patient was placed in dorsal lithotomy position, appropriately padded, prepped and draped in usual sterile fashion for cystoscopy. Time-out was taken. All were in agreement on procedure. Laterality, burn precautions, beta-blockade concerns were addressed. SCDs were placed for DVT prophylaxis. Appropriate parenteral prophylactic antibiotics were administered. The patient then underwent cystoscopy using the 21-Palestinian cystoscope sheath, 30 and 70-degree lenses. The bladder was unremarkable except for indwelling calcified double-J stent. The encrusted stent was removed and then a 0.038 Glidewire was placed up the ureter into the right renal collecting system. After that, we performed semi-rigid ureteroscopy and went to the distal 8 mm stone and was able to fragment this stone. It was quite hard, required increased energy with a 200 micron fiber. After fragmenting the stone, I extracted the larger fragments and then encountered further stones up to the mid ureter. These stones were significant and anywhere from 4-5 mm and required laser fragmentation as well. After complete laser fragmentation of the distal and mid ureteral calculi, we cleared the stones from the ureter. I went further proximally and saw no further stones up to the UPJ. At that point, I then placed the BOA flexible digital ureteroscope over the preexisting Glidewire and traversed up the ureter easily to the kidney on the right side. At that point, I then encountered a stone in the renal pelvis about 6-mm. This was completely laser fragmented and this was then dusted into submillimeter particles. Using the [facility] 60 Watt holmium laser with retropulsion, I was able to dust the stones completely. Finally, after dusting the larger stones, I then got to the smaller stone fragments and completely dusted these stones as well. After complete dusting of both stones, I felt that we had adequately taken care of all the renal stones on the right side.   I inspected with fluoroscopy and direct vision seeing no further stone formation that was any larger than 1 mm. At that point, I felt that we have accomplished complete stone eradication and there was free floating stone in the renal pelvis, not much in the calyces at that point, and this was left to pass. I placed a 0.038 Glidewire back up the ureteroscope and then removed the ureteroscope completely and evaluated the ureter which was completely intact and the distal stone, mid ureteral stone fragments had all been removed. Over the Glidewire, a 6-Bruneian x 26 cm double-J ureteral stent was placed and this had a good curl in the kidney and good curl in the bladder. A long string was left attached to the left inner thigh and procedure was terminated after the drain was secured to the patient's left inner thigh with Bioclusive. Fluoroscopy confirmed adequate positioning of the double-J ureteral stent.       Maddy Krueger MD      [id number]  D:  07/24/2019 23:29  T:  07/25/2019 2:15  JOB #:  [id number]  CC:  MD Gokul Chisholm MD

## (undated) DEVICE — GDWIREGLDEWIRE 0.038INX150CM --

## (undated) DEVICE — TRAY PREP DRY W/ PREM GLV 2 APPL 6 SPNG 2 UNDPD 1 OVERWRAP

## (undated) DEVICE — SEAL INSTR CYSTO ADJ BX PRT SEAL FOR ACC UP TO 6FR

## (undated) DEVICE — GDWIRE 3CM FLX-TIP 0.038X150CM -- BX/5 SENSOR

## (undated) DEVICE — TUBING IRRIG L77IN DIA0.241IN L BOR FOR CYSTO W/ NVENT

## (undated) DEVICE — OPEN-END FLEXI-TIP URETERAL CATHETER: Brand: FLEXI-TIP

## (undated) DEVICE — SOLUTION IV STRL H2O 500 ML AQUALITE POUR BTL

## (undated) DEVICE — SOL IRR NACL 0.9% 500ML POUR --

## (undated) DEVICE — HYDROGEL COATED URETERAL DILATOR: Brand: NOTTINGHAM ONE-STEP

## (undated) DEVICE — SPONGE GZ W4XL4IN COT 12 PLY TYP VII WVN C FLD DSGN

## (undated) DEVICE — NITINOL STONE RETRIEVAL BASKET: Brand: ZERO TIP

## (undated) DEVICE — KENDALL SCD EXPRESS SLEEVES, KNEE LENGTH, MEDIUM: Brand: KENDALL SCD

## (undated) DEVICE — CONTAINER DRN 20L DISP FLUIDRN LLS

## (undated) DEVICE — IRRIGATION KT PMP SGL ACT SAPS -- BX/5

## (undated) DEVICE — SOLUTION IRRIG 3000ML 0.9% SOD CHL FLX CONT 0797208] ICU MEDICAL INC]

## (undated) DEVICE — STERILE POLYISOPRENE POWDER-FREE SURGICAL GLOVES: Brand: PROTEXIS

## (undated) DEVICE — Device

## (undated) DEVICE — SYRINGE MED 20ML STD CLR PLAS LUERLOCK TIP N CTRL DISP

## (undated) DEVICE — DUAL LUMEN URETERAL CATHETER